# Patient Record
Sex: FEMALE | URBAN - METROPOLITAN AREA
[De-identification: names, ages, dates, MRNs, and addresses within clinical notes are randomized per-mention and may not be internally consistent; named-entity substitution may affect disease eponyms.]

---

## 2022-01-31 ENCOUNTER — OFFICE VISIT (OUTPATIENT)
Dept: URBAN - METROPOLITAN AREA CLINIC 50 | Facility: CLINIC | Age: 87
End: 2022-01-31
Payer: MEDICARE

## 2022-01-31 DIAGNOSIS — H16.143 PUNCTATE KERATITIS, BILATERAL: ICD-10-CM

## 2022-01-31 DIAGNOSIS — H16.223 KERATOCONJUNCTIVITIS SICCA, BILATERAL: Primary | ICD-10-CM

## 2022-01-31 PROCEDURE — 99213 OFFICE O/P EST LOW 20 MIN: CPT | Performed by: OPTOMETRIST

## 2022-01-31 RX ORDER — CELECOXIB 100 MG/1
100 MG CAPSULE ORAL
Refills: 0 | Status: ACTIVE
Start: 2022-01-31

## 2022-01-31 RX ORDER — DICLOFENAC 10 MG/G
1 % GEL TOPICAL
Refills: 0 | Status: ACTIVE
Start: 2022-01-31

## 2022-01-31 RX ORDER — BACLOFEN 10 MG/1
10 MG TABLET ORAL
Refills: 0 | Status: ACTIVE
Start: 2022-01-31

## 2022-01-31 RX ORDER — ATENOLOL 25 MG/1
25 MG TABLET ORAL
Refills: 0 | Status: ACTIVE
Start: 2022-01-31

## 2022-01-31 RX ORDER — POLYVINYL ALCOHOL, POVIDONE .6; .5 G/100ML; G/100ML
SOLUTION/ DROPS INTRAOCULAR
Refills: 0 | Status: ACTIVE
Start: 2022-01-31

## 2022-01-31 RX ORDER — LIFITEGRAST 50 MG/ML
5 % SOLUTION/ DROPS OPHTHALMIC
Qty: 120 | Refills: 11 | Status: ACTIVE
Start: 2022-01-31

## 2022-01-31 RX ORDER — OMEPRAZOLE 20 MG/1
20 MG CAPSULE, DELAYED RELEASE ORAL
Refills: 0 | Status: ACTIVE
Start: 2022-01-31

## 2022-01-31 RX ORDER — CYCLOSPORINE 0.5 MG/ML
0.05 % EMULSION OPHTHALMIC
Refills: 0 | Status: ACTIVE
Start: 2022-01-31

## 2022-01-31 ASSESSMENT — INTRAOCULAR PRESSURE
OD: 16
OS: 16

## 2022-01-31 NOTE — IMPRESSION/PLAN
Impression: Keratoconjunctivitis sicca, bilateral: H10.004. Plan: Superior punctal plug removed per patients request. Still Diffuse SPK. Starting on Xidra 1gtt OU bid. Patient's dry eye complaint has persisted and artificial tears are not enough. Recommend additional treatment of [place]. Patient educated on how to use the new regimen in junction with the OTC ATs QID OU. Erx'd as requested. All questions answered RTC: 4-6 weeks for a dry eye check. 


XIDRA 1gtt OU QID